# Patient Record
Sex: FEMALE
[De-identification: names, ages, dates, MRNs, and addresses within clinical notes are randomized per-mention and may not be internally consistent; named-entity substitution may affect disease eponyms.]

---
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
## 2019-12-23 ENCOUNTER — HOSPITAL ENCOUNTER (OUTPATIENT)
Dept: HOSPITAL 97 - OR | Age: 19
Discharge: HOME | End: 2019-12-23
Attending: SURGERY
Payer: COMMERCIAL

## 2019-12-23 VITALS — DIASTOLIC BLOOD PRESSURE: 60 MMHG | SYSTOLIC BLOOD PRESSURE: 114 MMHG | OXYGEN SATURATION: 99 % | TEMPERATURE: 97 F

## 2019-12-23 DIAGNOSIS — K37: Primary | ICD-10-CM

## 2019-12-23 LAB
ALBUMIN SERPL BCP-MCNC: 4.2 G/DL (ref 3.4–5)
ALP SERPL-CCNC: 102 U/L (ref 45–117)
ALT SERPL W P-5'-P-CCNC: 23 U/L (ref 12–78)
AMYLASE SERPL-CCNC: 55 U/L (ref 25–115)
AST SERPL W P-5'-P-CCNC: 16 U/L (ref 15–37)
BUN BLD-MCNC: 13 MG/DL (ref 7–18)
GLUCOSE SERPLBLD-MCNC: 88 MG/DL (ref 74–106)
HCT VFR BLD CALC: 38.6 % (ref 36–45)
LYMPHOCYTES # SPEC AUTO: 1.3 K/UL (ref 0.7–4.9)
PMV BLD: 9.5 FL (ref 7.6–11.3)
POTASSIUM SERPL-SCNC: 4.2 MMOL/L (ref 3.5–5.1)
RBC # BLD: 4.74 M/UL (ref 3.86–4.86)

## 2019-12-23 PROCEDURE — 85025 COMPLETE CBC W/AUTO DIFF WBC: CPT

## 2019-12-23 PROCEDURE — 36415 COLL VENOUS BLD VENIPUNCTURE: CPT

## 2019-12-23 PROCEDURE — 80048 BASIC METABOLIC PNL TOTAL CA: CPT

## 2019-12-23 PROCEDURE — 88304 TISSUE EXAM BY PATHOLOGIST: CPT

## 2019-12-23 PROCEDURE — 80076 HEPATIC FUNCTION PANEL: CPT

## 2019-12-23 PROCEDURE — 82150 ASSAY OF AMYLASE: CPT

## 2019-12-23 PROCEDURE — 84703 CHORIONIC GONADOTROPIN ASSAY: CPT

## 2019-12-23 PROCEDURE — 44970 LAPAROSCOPY APPENDECTOMY: CPT

## 2019-12-23 PROCEDURE — 0DTJ4ZZ RESECTION OF APPENDIX, PERCUTANEOUS ENDOSCOPIC APPROACH: ICD-10-PCS

## 2019-12-23 RX ADMIN — HYDROMORPHONE HYDROCHLORIDE ONE MG: 1 INJECTION, SOLUTION INTRAMUSCULAR; INTRAVENOUS; SUBCUTANEOUS at 11:08

## 2019-12-23 RX ADMIN — HYDROMORPHONE HYDROCHLORIDE ONE MG: 1 INJECTION, SOLUTION INTRAMUSCULAR; INTRAVENOUS; SUBCUTANEOUS at 11:20

## 2019-12-23 NOTE — XMS REPORT
Patient Summary Document

 Created on:2019



Patient:ALEXIS FELIX

Sex:Female

:2000

External Reference #:[id number]





Demographics







 Address  [address]



   [place] [address]

 

 Home Phone  [phone]

 

 Work Phone  [phone]

 

 Email Address  [email or web address]

 

 Preferred Language  Unknown

 

 Marital Status  Unknown

 

 [employer] Affiliation  Unknown

 

 Race  Unknown

 

 Additional Race(s)  Unavailable



   Unavailable

 

 Ethnic Group  Unknown









Author







 Organization  [facility]

 

 Address  [address]



   [place] [address]

 

 Phone  [phone]









Support







 Name  Relationship  Address  Phone

 

 JEANNETTE ARAUJO  Unavailable  [address] [facility]  [phone]



     [address]  



     [place] [address]  

 

 VAN JEANNETTE  Unavailable  [address] [facility]  [phone]



     [address]  



     [place] [address]  









Care Team Providers







 Name  Role  Phone

 

 Unavailable  Unavailable  Unavailable









Payers







 Payer Name  Policy Type  Policy Number  Effective Date  Expiration Date







Problems

This patient has no known problems.



Allergies, Adverse Reactions, Alerts







 Allergy  Allergy  Status  Severity  Reaction(s)  Onset  Inactive  Treating  
Comments



 Name  Type        Date  Date  Clinician  

 

 No Known  DA  Active  U    2017      



 Allergies          -20      



           00:00:0      



           0      







Medications

This patient has no known medications.



Results







 Test Description  Test Time  Test Comments  Text Results  Atomic Results  
Result Comments









 - US PELVIS COMPLETE  2019 14:07:00     Patient Name: ALEXIS FELIX  



                   Unit No: [id number]  



            EXAMS:  



                              CPT CODE:  



           [id number] US PELVIS COMPLETE  



                             [address]779  



              PELVIC ULTRASOUND,  



       2019:     COMPARISON: None  



         CLINICAL HISTORY: PEL PAIN  



       TECHNIQUE: Transabdominal and  



       endovaginal scanning was  



       performed.     FINDINGS:  The  



       uterus measures 7.0 x 3.9 x 5.5  



       cm. The endometrial stripe  



       measured 9 mm in thickness. There  



       was a probable 0.7 x 0.3 x 0.5 cm  



       polyp with a vascular stalk in the  



       inferior endometrial region.  



       No uterine fibroids were  



       visualized.     The right ovary  



       measures 3.9 x 2.7 x 2.1 cm and  



       contains a probable  1.3 x 0.5 x  



       1.7 cm partially collapsed cyst  



       and/or follicle in the  right  



       ovary. Doppler flow is  



       demonstrated in the right ovary.  



          The left ovary measures 3.1 x  



       2.1 x 2.3 cm and  appears normal.  



       Doppler flow is demonstrated in  



       the left ovary.     Trace amount  



       of free fluid is present.  



       CONCLUSION: Findings are  



       suspicious for small endometrial  



       polyp.  Please see above comments.  



           Probable 1.7 cm partially  



       collapsed right ovarian cyst.  



           ** Electronically Signed by  



       Alok Peres MD on 2019 at  



       1407 **                 Reported  



       and signed by: Alok Peres MD  



                        CC: Alex Hogan MD  



         



         



         



       Technologist: Melissa Mendez RDMS  



                   Probe:  



       Trnscrbd D/T: 2019 (3082)  



       t.SDR.AJ13  



        Orig Print D/T: S: 2019  



       (7579)         [facility]       NAME:  



       ALEXIS FELIX  



       Radiology Department  



        PHYS: Alex Hayward     [address]  



              : 2000 AGE: 19  



        SEX: F   [place] [address]  



                  ACCT NO: [id number]  



       LOC: MARYANNRAD        PHONE #:  



       [phone]               EXAM  



       DATE: 2019 STATUS: REG CLI  



         FAX #: [phone]  



        RAD NO:            Page  1  



                    Signed Report  



                             Patient  



       Name: ALEXIS FELIX  



          Unit No: [id number]  



       EXAMS:  



                         CPT CODE:  



       [id number] US PELVIS COMPLETE  



                         [address]  



           <Continued>  



                                    The  



       [facility]  



       NAME: ALEXIS FELIX  



           Radiology Department  



            PHYS: Alex Hayward     [address]  



       [place]  



       : 2000 AGE: 19      SEX:  



       F   [place] [address]  



            ACCT NO: [id number] LOC:  



       MARYANNRAD        PHONE #: [phone]  



                     EXAM DATE:  



       2019 STATUS: REG CLI     FAX  



       #: [phone]               RAD  



       NO:            Page  2  



               Signed Report  



         

 

 - DUP AB/PEL/SC/LTD  2019 14:07:00     Patient Name: ALEXIS FELIX  



                   Unit No: [id number]  



            EXAMS:  



                              CPT CODE:  



           [id number] DUP AB/PEL/SC/LTD  



                             [address]  



              PELVIC ULTRASOUND,  



       2019:     COMPARISON: None  



         CLINICAL HISTORY: PEL PAIN  



       TECHNIQUE: Transabdominal and  



       endovaginal scanning was  



       performed.     FINDINGS:  The  



       uterus measures 7.0 x 3.9 x 5.5  



       cm. The endometrial stripe  



       measured 9 mm in thickness. There  



       was a probable 0.7 x 0.3 x 0.5 cm  



       polyp with a vascular stalk in the  



       inferior endometrial region.  



       No uterine fibroids were  



       visualized.     The right ovary  



       measures 3.9 x 2.7 x 2.1 cm and  



       contains a probable  1.3 x 0.5 x  



       1.7 cm partially collapsed cyst  



       and/or follicle in the  right  



       ovary. Doppler flow is  



       demonstrated in the right ovary.  



          The left ovary measures 3.1 x  



       2.1 x 2.3 cm and  appears normal.  



       Doppler flow is demonstrated in  



       the left ovary.     Trace amount  



       of free fluid is present.  



       CONCLUSION: Findings are  



       suspicious for small endometrial  



       polyp.  Please see above comments.  



           Probable 1.7 cm partially  



       collapsed right ovarian cyst.  



           ** Electronically Signed by  



       Alok Peres MD on 2019 at  



       1407 **                 Reported  



       and signed by: Alok Peres MD  



                        CC: Alex Hogan MD  



         



         



         



       Technologist: Melissa Mendez RDMS  



                   Probe:  



       Trnscrbd D/T: 2019 (9808)  



       t.SDR.AJ13  



        Orig Print D/T: S: 2019  



       (3911)         [facility]       NAME:  



       ALEXIS FELIX  



       Radiology Department  



        PHYS: Alex Hayward     [address]  



              : 2000 AGE: 19  



        SEX: F   [place] [address]  



                  ACCT NO: [id number]  



       LOC: PATSY.RAD        PHONE #:  



       [phone]               EXAM  



       DATE: 2019 STATUS: REG CLI  



         FAX #: [phone]  



        RAD NO:            Page  1  



                    Signed Report  



                             Patient  



       Name: ALEXIS FELIX  



          Unit No: [id number]  



       EXAMS:  



                         CPT CODE:  



       [id number] DUP AB/PEL/SC/LTD  



                         [address]  



           <Continued>  



                                    The  



       [facility]  



       NAME: ALEXIS FELIX  



           Radiology Department  



            PHYS: Alex Hayward     [address]  



       [address]  



       : 2000 AGE: 19      SEX:  



       F   [place] [address]  



            ACCT NO: [id number] LOC:  



       PATSY.RAD        PHONE #: [phone]  



                     EXAM DATE:  



       2019 STATUS: REG CLI     FAX  



       #: [phone]               RAD  



       NO:            Page  2  



               Signed Report  



         

 

 - US TRANSVAGINAL W/PELVIS  2019 14:07:00     Patient Name: ALEXIS FELIX  



                   Unit No: [id number]  



            EXAMS:  



                              CPT CODE:  



           [id number] US TRANSVAGINAL  



       W/PELVIS                   [address]  



                   PELVIC ULTRASOUND,  



       2019:     COMPARISON: None  



         CLINICAL HISTORY: PEL PAIN  



       TECHNIQUE: Transabdominal and  



       endovaginal scanning was  



       performed.     FINDINGS:  The  



       uterus measures 7.0 x 3.9 x 5.5  



       cm. The endometrial stripe  



       measured 9 mm in thickness. There  



       was a probable 0.7 x 0.3 x 0.5 cm  



       polyp with a vascular stalk in the  



       inferior endometrial region.  



       No uterine fibroids were  



       visualized.     The right ovary  



       measures 3.9 x 2.7 x 2.1 cm and  



       contains a probable  1.3 x 0.5 x  



       1.7 cm partially collapsed cyst  



       and/or follicle in the  right  



       ovary. Doppler flow is  



       demonstrated in the right ovary.  



          The left ovary measures 3.1 x  



       2.1 x 2.3 cm and  appears normal.  



       Doppler flow is demonstrated in  



       the left ovary.     Trace amount  



       of free fluid is present.  



       CONCLUSION: Findings are  



       suspicious for small endometrial  



       polyp.  Please see above comments.  



           Probable 1.7 cm partially  



       collapsed right ovarian cyst.  



           ** Electronically Signed by  



       Alok Peres MD on 2019 at  



       1407 **                 Reported  



       and signed by: Alok Peres MD  



                        CC: Alex Hogan MD  



         



         



         



       Technologist: Melissa Mendez RDMS  



                   Probe: [id number]  



       Trnscrbd D/T: 2019 (3947)  



       [id number]  



        Orig Print D/T: S: 2019  



       (3636)         [facility]  



       [facility]       NAME:  



       ALEXIS FELIX  



       Radiology Department  



        PHYS: Alex Hayward     [address]  



              : 2000 AGE: 19  



        SEX: F   [place], [place] [address]  



                  ACCT NO: [id number]  



       LOC: PATSY.RAD        PHONE #:  



       [phone]               EXAM  



       DATE: 2019 STATUS: REG CLI  



         FAX #: [phone]  



        RAD NO:            Page  1  



                    Signed Report  



                             Patient  



       Name: ALEXIS FELIX  



          Unit No: [id number]  



       EXAMS:  



                         CPT CODE:  



       [id number] US TRANSVAGINAL W/PELVIS  



                         [address]  



           <Continued>  



                                    The  



       [facility]  



       NAME: ALEXIS FELIX  



           Radiology Department  



            PHYS: OLIVIA HoganAlex PILAR     [address]  



       [address]  



       : 2000 AGE: 19      SEX:  



       F   [place], [place] [address]  



            ACCT NO: [id number] LOC:  



       MARYANNRAD        PHONE #: [phone]  



                     EXAM DATE:  



       2019 STATUS: REG CLI     FAX  



       #: [phone]               RAD  



       NO:            Page  2  



               Signed Report  



         

 

 FLUID,OTHER  2019 20:20:00    ----------------------------------  



       ----------------------------------  



       ------------------------RUN DATE:  



       19  



       [facility]  



               PAGE 1   RUN TIME:   



                               Specimen  



       Inquiry                    RUN  



       USER: INTERFACE  



         



         



       ----------------------------------  



       ----------------------------------  



       ------------------------PATIENT:  



       ALEXIS FELIX                ACCT  



       #: [id number] LOC:  MARYANNMSU      U  



       #: [id number]  



                        AGE/SX: 19/F  



          ROOM: [facility]     RE/15/19REG DR:  Fritz Cook MD            :    [birth year]  



       BED:  A          DIS: 19  



         



       STATUS: DIS IN       TLOC:  



         



       ----------------------------------  



       ----------------------------------  



       ------------------------ SPEC #:  



       [id number]     RECD:  



       09/15/     STATUS:  JOSEPHINE LINDSEY #: [id number]  



                     BUDDY: 09/15/19-  



          SUBM DR: Fritz Cook MD  



               ENTERED:    



       SP TYPE: ALESIA SOLOMON DR:  



         



       ORDERED:  CYTOLOGY/SACCOM  



         



                              PROCEDURES:  



       CYTOLOGY/SACCOM (Incomplete)  



       TISSUES:  



       OVARIAN/PARAOVARIAN CYST FLUID -  



       CYST FLUID         CLINICAL  



       HISTORY    19 year old, ruptured  



       hemorrhagic cyst  (wpd)  



       FINAL DIAGNOSIS    Designated  



       "cyst fluid", aspiration  



       (cytospins and cell block):  



       - no malignant cells identified,  



       blood only       CPT code(s):  



       24078, 53569   espinozag/lamont  dt: 19  



              GROSS DESCRIPTION    The  



       specimen is received in a  



       container, labeled with the  



       patient's name and   designated  



       "cyst fluid" and consists of 25 cc  



       brown fluid.  Two cytospins and  



       one   cell block were prepared.  



       hj/wpd  19 @ 1341  



       MICROSCOPIC DESCRIPTION    The  



       specimen consists of blood only.  



       pkg/kr  dt: 19       COMMENT:  



        The corresponding surgical  



       pathology [id number] showed:  



                 Endometrium, curettage:  



             - benign secretory  



       endometrium   [facility]  



       19--------------------------  



       ----------------------------------  



       --------------------------------  



       Signed  



       ________________________________  



             Carly Reyes 19---------------------------------  



       ----------------------------------  



       ------------------------  



                                    **  



       END OF REPORT **  









 OPIATE CONFIRMATION GCMS  2019 11:13:00    









   Test Item  Value  Reference Range  Comments









 DRUG CONFIRMATION BY GC/MS (test  POSITIVE  NEGATIVE  Drug(s) Identified: 
hydromorphone



 code=DRUGCON)      amount 529 ng/mL



         morphine             1421 ng/ml



             GC/MS Cutoff: 0.



ENDOMETRIUM,[id number] 12:30:00-------------------------------------------
-------------------------------------------------RUN DATE: 10/08/19            
          [facility]                       PAGE 1   RUN TIME: 1043   
                     Specimen Inquiry                    RUN USER: INTERFACE   
                                  ----------------------------------------------
----------------------------------------------PATIENT: ALEXIS FELIX          
      ACCT #: [id number] [facility] [id number]                      
                 AGE/SX: 19/F         ROOM: [facility]     RE/15/19REG DR:  Fritz Cook MD            :    [birth year]     BED:  A          DIS: 19  
                                  STATUS: DIS IN       TLOC:           ---------
--------------------------------------------------------------------------------
--- SPEC #: [id number]     RECD: 09/15/     STATUS:  JOSEPHINE LINDSEY #: [id number]                           BUDDY: 09/15/19-    SUBM DR: Fritz Cook MD             ENTERED:      SP TYPE: ENDOMETBX      
JUANITO DR:                               ORDERED:  LEVEL IV                      
   CODES: S48140 - ENDOMETRIUM, NO PROCEDURES: LEVEL IV (Incomplete) TISSUES:  
       ENDOMETRIUM, NOS - ENDOMETRIAL CURETTINGS         CLINICAL HISTORY    19 
year old, rupturedhemorrhagic cyst  (kr)         FINAL DIAGNOSIS    Endometrium
, curettage:        - benign secretory endometrium           CPT code(s):  
54980   [facility]  19        GROSS DESCRIPTION    ANATOMIC SOURCEOF TISSUE (per 
Requisition):   Endometrial curettings       The specimen is received in a 
formalin-filled container, labeled with the patient's   name and designated 
"endometrial curettings".  The specimen consists of a 1.0 x 0.7   x 0.2 cm 
aggregate of multiple tan-pink to tan-red soft tissues admixed with   blood-
tinged mucoid material, submitted in toto as A1.  natalia 19 @ 0957----------
--------------------------------------------------------------------------------
-- Signed ________________________________         Lupe Melvin MD 19 
1230     -----------------------------------------------------------------------
---------------------                                           ** END OF 
REPORT **CBC W/AUTO [id number] 04:47:00





 Test Item  Value  Reference Range  Comments

 

 WHITE BLOOD CELL (test code=WBC)  6.6 K/mm3  6.6-12.1  

 

 RED BLOOD CELL (test code=RBC)  3.72 M/mm3  3.45-5.01  

 

 HEMOGLOBIN (test code=HGB)  10.3 g/dL  10.7-13.9  

 

 HEMATOCRIT (test code=HCT)  31.2 %  32.1-42.1  

 

 MEAN CELL VOLUME (test code=MCV)  84 fL  84.1-94.8  

 

 MEAN CELL HGB (test code=MCH)  27.7 pg  27-35  

 

 MEAN CELL HGB CONCETRATION (test code=MCHC)  33.0 gm/dL  32.2-34.1  

 

 RED CELL DISTRIBUTION WIDTH (test code=RDW)  13.9 %  12.4-16.5  

 

 PLATELET COUNT (test code=PLT)  162 K/mm3  133-385  

 

 IMMATURE PLATELET FRACTION (test code=IPF)  0.0 %  0.0-10.8  

 

 MEAN PLATELET VOLUME (test code=MPV)  11.9 fl  9.1-12.7  

 

 NEUTROPHIL % (test code=NT%)  71.4 %  56.5-79.4  

 

 LYMPHOCYTE % (test code=LY%)  18.3 %  14.3-34.3  

 

 MONOCYTE % (test code=MO%)  9.8 %  5.1-10.4  

 

 EOSINOPHIL % (test code=EO%)  0.0 %  0.1-3.0  

 

 BASOPHIL % (test code=BA%)  0.2 %  0.1-1.0  

 

 NEUTROPHIL # (test code=NT#)  4.7 K/mm3    

 

 LYMPHOCYTE # (test code=LY#)  1.2 K/mm3    

 

 MONOCYTE # (test code=MO#)  0.7 K/mm3    

 

 EOSINOPHIL # (test code=EO#)  0 K/mm3    

 

 BASOPHIL # (test code=BA#)  0.0 K/mm3    

 

 RBC MORPHOLOGY REQUIRED (test code=RBCM)  NORMAL  NORMAL  

 

 PLATELET MORPHOLOGY REQUIRED (test code=PLTMR)  NORMAL  NORMAL  



DRUGS OF ABUSE SCREEN2019-09-15 10:27:00





 Test Item  Value  Reference Range  Comments

 

 UR COCAINE (test code=COCAU)  NEGATIVE  NEGATIVE  DETECTION CUT OFF: 150



       ng/mL

 

 UR CANNABINOIDS (test code=CANU)  NEGATIVE  NEGATIVE  DETECTION CUT OFF: 50 ng/
mL

 

 UR AMPHETAMINE (test code=AMPHU)  NEGATIVE  NEGATIVE  DETECTION CUT OFF: 500



       ng/mL

 

 UR BARBITURATE QUAL (test  NEGATIVE  NEGATIVE  DETECTION CUT OFF: 200



 code=BARBQLU)      ng/mL

 

 UR BENZODIAZEPINE (test  NEGATIVE  NEGATIVE  DETECTION CUT OFF: 150



 code=BENZU)      ng/mL

 

 UR OPIATES QUAL (test  POSITIVE  NEGATIVE  RESULTS CALLED TO



 code=OPIAQLU)      LISSETTEREAD BACK &



       CONFIRMED? Y.BY F.LAB.GF



       09/15/19 1027. DETECTION



       CUT OFF: 100 ng/mL

 

 UR PHENCYCLIDINE (PCP) (test  NEGATIVE  NEGATIVE  DETECTION CUT OFF: 25 ng/mL



 code=PHENCU)      



UR HCG QUAL2019-09-15 10:01:00





 Test Item  Value  Reference Range  Comments

 

 UR HCG QUAL (test  NEGATIVE    1. Very dilute urine specimens, as



 code=HCGQLU)      indicated by a lowspecific gravity,



       may not contain representative



       levels ofhCG. 2. False negative



       results may occur when the levels



       of hCGare below the sensitivity



       level of the test. If pregnancy is



       still suspected, a first



       morningurine specimen should be



       collected 48 hours later andtested.



AB TREPONEMA2019-09-15 08:15:00





 Test Item  Value  Reference Range  Comments

 

 AB TREPONEMA (test code=TREPAB)  NONREACTIVE  NONREACTIVE  



AB HIV 1   22019-09-15 08:15:00





 Test Item  Value  Reference Range  Comments

 

 AB HIV 1   2 (test  NONREACTIVE  NONREACTIVE  Done by [employer] [facility]



 code=QXR36KC)      Gen HIV Ag/Ab Combo Screen



AB TREPONEMA2019-09-15 07:46:00





 Test Item  Value  Reference Range  Comments

 

 AB TREPONEMA (test code=TREPAB)  NONREACTIVE  NONREACTIVE  



AB HIV 1   22019-09-15 07:46:00





 Test Item  Value  Reference Range  Comments

 

 AB HIV 1   2 (test code=TXC46JR)    NONREACTIVE  



HCG SERUM2019-09-15 07:21:00





 Test Item  Value  Reference Range  Comments

 

 HCG SERUM (test code=HCG)  <1    INTERPRETATION:VALUES BETWEEN 15-20



       milliInternational units/mL NEED TO



       BERETESTED WITHIN 48 HOURS. All units



       for these ranges are in



       milliInternationalunits/mL0-1 WK AFTER



       CONCEPTION              0-50    1-2



       WKS AFTER CONCEPTION



       -3 WKS AFTER CONCEPTION



         100-1,0003-4 WKS AFTER CONCEPTION



              500-6,0001-2 MONTHS AFTER



       CONCEPTION      5,000-200,0002-3



       MONTHS AFTER CONCEPTION



       10,000-100,0002ND TRIMESTER



               3,000-50,0003RD TRIMESTER



                     1,000-50,000 SPECIMENS



       WITH AN HCG LEVEL FROM 0-6



       milliInternationalunits/mL SHOULD BE



       CONSIDERED NEGATIVE



AMY FUNK [facility] W/AUTO DIFF2019-09-15 06:56:00





 Test Item  Value  Reference Range  Comments

 

 WHITE BLOOD CELL (test code=WBC)  6.0 K/mm3  6.6-12.1  

 

 RED BLOOD CELL (test code=RBC)  3.90 M/mm3  3.45-5.01  

 

 HEMOGLOBIN (test code=HGB)  10.8 g/dL  10.7-13.9  

 

 HEMATOCRIT (test code=HCT)  33.3 %  32.1-42.1  

 

 MEAN CELL VOLUME (test code=MCV)  85 fL  84.1-94.8  

 

 MEAN CELL HGB (test code=MCH)  27.7 pg  27-35  

 

 MEAN CELL HGB CONCETRATION (test code=MCHC)  32.4 gm/dL  32.2-34.1  

 

 RED CELL DISTRIBUTION WIDTH (test code=RDW)  14.1 %  12.4-16.5  

 

 PLATELET COUNT (test code=PLT)  147 K/mm3  133-385  

 

 IMMATURE PLATELET FRACTION (test code=IPF)  0.0 %  0.0-10.8  

 

 MEAN PLATELET VOLUME (test code=MPV)  12.1 fl  9.1-12.7  

 

 NEUTROPHIL % (test code=NT%)  76.9 %  56.5-79.4  

 

 LYMPHOCYTE % (test code=LY%)  13.9 %  14.3-34.3  

 

 MONOCYTE % (test code=MO%)  7.7 %  5.1-10.4  

 

 EOSINOPHIL % (test code=EO%)  0.7 %  0.1-3.0  

 

 BASOPHIL % (test code=BA%)  0.5 %  0.1-1.0  

 

 NEUTROPHIL # (test code=NT#)  4.6 K/mm3    

 

 LYMPHOCYTE # (test code=LY#)  0.8 K/mm3    

 

 MONOCYTE # (test code=MO#)  0.5 K/mm3    

 

 EOSINOPHIL # (test code=EO#)  0.04 K/mm3    

 

 BASOPHIL # (test code=BA#)  0.0 K/mm3    

 

 RBC MORPHOLOGY REQUIRED (test code=RBCM)  NORMAL  NORMAL  

 

 PLATELET MORPHOLOGY REQUIRED (test code=PLTMR)  NORMAL  NORMAL  



INFLUENZA A B PCR2019-09-15 06:35:00





 Test Item  Value  Reference Range  Comments

 

 INFLUENZA A PCR (test code=FLUAPCR)  NEGATIVE  NEGATIVE  

 

 INFLUENZA B PCR (test code=FLUBPCR)  NEGATIVE  NEGATIVE

## 2019-12-23 NOTE — P.BOP
Preoperative diagnosis: RLQ abd pain, appendiceal mass


Postoperative diagnosis: Appendicitis


Primary procedure: Diagnostic laparoscopy, Laparoscopic appendectomy


Assistant: NEIDA MURPHY (RNFA)


Estimated blood loss: <10cc


Specimen: hao


Findings: see dictation


Anesthesia: General


Complications: None


Transferred to: Recovery Room


Condition: Good

## 2019-12-23 NOTE — XMS REPORT
Summary of Care

 Created on:2019



Patient:Karen Morton

Sex:Female

:2000

External Reference #:[id number]





Demographics







 Address  [address]



   [place] [address]

 

 Home Phone  [phone]

 

 Work Phone  [phone]

 

 Email Address  [email or web address]

 

 Preferred Language  English

 

 Marital Status  Single

 

 Muslim Affiliation  Unknown

 

 Race  White

 

 Ethnic Group   or 









Author







 Organization  [facility]

 

 Address  [address] [place] [id number]









Support







 Name  Relationship  Address  Phone

 

 No Info Recd  Unavailable  Unavailable  [phone]

 

 Zulema Leon  Unavailable  [address]  [phone]



     [place] [address]  









Care Team Providers







 Name  Role  Phone

 

 Alex Hogan  Primary Care Provider  [phone]









Reason for Visit







 Reason  Comments

 

 Abdominal Pain  x1 week worsening over last 2 days







Encounter Details







 Date  Type  Department  Care Team  Description

 

 2019  Nurse Visit  [facility]  Unknown, Attending  Lower 
abdominal pain



     Urgent Care



  



Nurse, [facility]  (Primary Dx)



     [address] [place] [address]    



     [id number] [phone]    







Allergies

No Known Allergiesdocumented as of this encounter (statuses as of 2019)



Medications

No known medicationsdocumented as of this encounter (statuses as of 2019)



Active Problems

Not on filedocumented as of this encounter (statuses as of 2019)



Social History







 Tobacco Use  Types  Packs/Day  Years Used  Date

 

 Never Smoker        









 Smokeless Tobacco: Never Used      









 Sex Assigned at Birth  Date Recorded

 

 Not on file  









 Job Start Date  Occupation  Industry

 

 Not on file  Not on file  Not on file









 Travel History  Travel Start  Travel End









 No recent travel history available.



documented as of this encounter



Last Filed Vital Signs







 Vital Sign  Reading  Time Taken  Comments

 

 Blood Pressure  114/68  2019  8:37 PM CDT  

 

 Pulse  99  2019  8:37 PM CDT  

 

 Temperature  37.1 C (98.8 F)  2019  8:37 PM CDT  

 

 Respiratory Rate  18  2019  8:37 PM CDT  

 

 Oxygen Saturation  98%  2019  8:37 PM CDT  

 

 Inhaled Oxygen Concentration  -  -  

 

 Weight  68.7 kg (151 lb 8 oz)  2019  8:37 PM CDT  

 

 Height  160 cm (5' 3")  2019  8:37 PM CDT  

 

 Body Mass Index  26.84  2019  8:37 PM CDT  



documented in this encounter



Progress Notes

Lexy Munoz RN - 2019  8:15 PM CDTcomplaining of abdominal pain. 
Patient reports x1 week and has worsened over the last two days, c/o pain 10/10 
lower abd. Tenderness to palpation. Patient reports history of none

/68  | Pulse 99  | Temp 37.1 C (98.8 F) (Oral)  | Resp 18  | Ht 5' 3" 
(1.6 m)  | Wt 151 lb8 oz (68.7 kg)  | LMP 2019  | SpO2 98%  | BMI 26.84 kg
/m



Patient AAOx4, ambulatory, eupneic, skin pink/warm/dry, no acute distress noted.



Patient encouraged to seek emergency medical treatment at local emergency room 
for further evaluation. Case discussed with LILIA Peters who agrees with 
treatment plan recommendations. Patient verbalizes understanding and states she 
will be seen at [facility]. Ambulance transport by [facility] offered to patient 
but refused. Left via private vehicle



Patient leaves urgent care @ 825pm en route to [facility] AAOx4, ambulatory, 
in no acute distress.

Electronically signed by Lexy Munoz RN at 2019  8:40 PM 
CDTdocumented in this encounter



Plan of Treatment







 Health Maintenance  Due Date  Last Done  Comments

 

 MENINGOCOCCAL B VACCINES (1 of 2 -  2009    



 Risk Bexsero 2-dose series)      

 

 VARICELLA VACCINES (1 of 2 - 13+  2012    



 2-dose series)      

 

 HPV VACCINES (1 - Female 3-dose  2014    



 series)      

 

 CHLAMYDIA SCREENING  2015    

 

 DTaP,Tdap,and Td Vaccines (1 -  2018    



 Tdap)      

 

 INFLUENZA VACCINE (#1)  2019    

 

 MENINGOCOCCAL VACCINE  Aged Out    No longer eligible based on



       patient's age to complete



       this topic

 

 PNEUMOCOCCAL 0-64 YEARS COMBINED  Aged Out    No longer eligible based on



 SERIES      patient's age to complete



       this topic



documented as of this encounter



Results

Not on filedocumented in this encounter



Visit Diagnoses







 Diagnosis

 

 Lower abdominal pain - Primary







 Abdominal pain, other specified site



documented in this encounter



Insurance







 Payer  Benefit Plan / Group  Subscriber ID  Effective Dates  Phone  Address  
Type

 

 ROSA LEE II  [id number]  2015-Present      HMO/PPO/POS









 Guarantor Name  Account Type  Relation to  Date of  Phone  Billing



     Patient  Birth    Address

 

 VANZULEMA  Personal/Family  Mother  10/04/1980  [phone]  [address] 







         (Home)  [place]



           [id number]



documented as of this encounter

## 2019-12-23 NOTE — OP
Date of Procedure:  12/23/2019



Surgeon:  Messi Leon MD



Assistant:  JORDAN Freeman.



Preoperative Diagnoses:  Right lower quadrant persistent abdominal pain, appendiceal mass/abnormality
 on imaging.



Postoperative Diagnoses:  Right lower quadrant persistent abdominal pain, appendiceal mass/abnormalit
y on imaging, appendicitis.



Procedure:  Diagnostic laparoscopy, laparoscopic appendectomy.



Specimen:  Appendix.



Findings:  The appendix looks asymmetrical in shape and color with some erythematous areas.  The main
 appendix is bigger than the distal appendix, but is also on even in shape in multiple places of the 
appendix.  The base of the appendix seems to be spared.  The appendix looks tangled with the fallopia
n tube, but there is no adhesions there just all together that make sense based on the CAT scan findi
ngs that they are trying to rule out adnexal versus appendiceal mass.  Then, this case, the right ova
ry and left ovary shows no masses, fallopian tube with no masses, fimbriae with no masses.  The appen
tono is seen right over the fimbriae and underneath the ovary and have that abnormalities mention befo
re with apparently some inflammation of the appendix itself, mild.  The ascending, transverse, descen
ding colon, we did not see any extraluminal masses or any inflammation, at least extraluminal.  Liver
s with no masses seen at least out of the parenchyma and a stomach is soft and compressible.  The sma
ll bowel with no masses seen.  Inguinal region with no masses seen.  A uterus with no masses seen.  C
ul-de-sac with no masses seen.



Indications:  This is a case of a female who comes to us with persistent right upper quadrant pain.  
CAT scan shows some abnormalities, and what they believe it is an appendix, probably a mass, so diagn
oses laparoscopy, laparoscopic possible open appendectomy fully explained to the patient, which inclu
de, but are not limited to infection, bleeding, damage to adjacent structures, anesthesia complicatio
n, MI, and even death.  She also understands this may not relieve any symptoms.  She might need more 
than one surgical intervention.  She understood, signed a consent.



Description Of Procedure:  The patient was brought to the operating room, placed in supine position. 
 Anesthesia was without complication.  Time-out was called.  Abdomen was prepped and draped in usual 
sterile fashion.  Local anesthesia was applied followed by sharp incision of the skin.  In the infrau
mbilical region, incision was carried down to fascia, which was opened under direct vision.  Peritone
um was encountered, opened under direct vision.  Vicryl #1 placed inside of the fascia.  Steven troca
r was carefully introduced.  No bleeding was obtained.  We initially did the diagnostic laparoscopy f
irst without putting the other trocars.  Then, we put a 5 mm trocar in the suprapubic and left lower 
quadrant under direct visualization.  This allowed me to even better visualize the abdomen with findi
ngs that I described above.  We created a window in the base of the appendix, transected that with an
 Endo DEEJAY 45 mm 3.5 and the mesoappendix with an Endo DEEJAY 45 mm 2.5.  Further hemostasis was obtained
 with the help of hemoclips.  Appendix removed from abdominal cavity using an EndoCatch through the u
mbilical incision.  The area was inspected once again.  No bowel leak.  No bleeding.  At that moment,
 I proceeded to remove the trocars under direct vision.  Deflated the pneumoperitoneum.  Closed the f
ascia with #1 Vicryl, irrigated subcutaneous tissue, closed that with 3-0 chromic, and skin with 3-0 
chromic in a subcuticular fashion.  Sponge count and instrument counts were correct.  Patient tolerat
ed the procedure well.  Patient was sent to recovery in stable condition.



Diagnoses:  Right lower quadrant abdominal pain, appendicitis, appendiceal mass.



Disposition:  Home.



Activity:  As tolerated.  No heavy lifting.



Followup:  Follow up in my office in 1 week.  Call for appointment [phone].  Keep area dry for 48 ho
urs, then may shower.  Keep Steri-Strips intact.



Medications:  Tylenol No. 3 q.4 hours p.r.n. pain and Cipro 500 p.o. q.12.





RONNI/CHERRIE

DD:  12/23/2019 10:58:43Voice ID:  [id number]

DT:  12/23/2019 22:06:03Report ID:  [id number]